# Patient Record
Sex: FEMALE | Race: WHITE | NOT HISPANIC OR LATINO | Employment: STUDENT | URBAN - METROPOLITAN AREA
[De-identification: names, ages, dates, MRNs, and addresses within clinical notes are randomized per-mention and may not be internally consistent; named-entity substitution may affect disease eponyms.]

---
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
## 2018-04-19 DIAGNOSIS — L70.0 ACNE VULGARIS: ICD-10-CM

## 2018-04-19 RX ORDER — TRETINOIN 0.25 MG/G
CREAM TOPICAL
Qty: 45 G | Refills: 0 | Status: SHIPPED | OUTPATIENT
Start: 2018-04-19 | End: 2018-07-19

## 2018-04-19 NOTE — LETTER
[facility]  [address]  [facility] [address]  Phone: [phone]    April 19, 2018    Suzanne Wray                                                                                                            [address] [place] [address]            Dear Ms. Wray,    We are concerned about your health care.  We recently provided you with a medication refill.  Many medications require routine follow-up with your Dermatology Provider.        At this time we ask that: You schedule a routine office visit with your Dermatology Provider to follow your Acne. Per 8-7-2017 Dermatology office visit dictation, you were to return to Dermatology clinic in 8 weeks for a recheck Acne appointment.    Your prescription: Has been refilled so you may have time for the above noted follow-up.  Please be seen prior to needing your next refill of medication.     Thank you,      Lyla SANDOVAL / [facility]

## 2018-04-19 NOTE — TELEPHONE ENCOUNTER
Needs Acne recheck appointment. Letter sent and note sent to pharmacy as well. Alba Lizarraga RN

## 2018-07-19 DIAGNOSIS — L70.0 ACNE VULGARIS: ICD-10-CM

## 2018-07-19 NOTE — TELEPHONE ENCOUNTER
Reason for Call:  Medication or medication refill:    Do you use a [facility]?  Name of the pharmacy and phone [facility] for the current request:  [facility] [phone]    Name of the medication requested: Tretinoin    Other request:   LAST REFILL: 04/19/2018  LOV: 08/07/2017      Can we leave a detailed message on this number? Not Applicable    Phone number patient can be reached at: Home number on file [phone] (home)    Best Time: NA    Call taken on 7/19/2018 at 8:47 AM by Denise Behrendt

## 2018-07-19 NOTE — LETTER
[facility]  [address]  [facility] [address]  Phone: [phone]    2018    Suzanne Wray                                                                                                            [address] [place] [address]            Dear Ms. Wray,    We are concerned about your health care.  We recently provided you with a medication refill.  Many medications require routine follow-up with your Dermatology Provider.      At this time we ask that: You schedule a routine office visit with your Dermatology Provider to follow your Acne. You need to be seen annually for a prescription renewal. You were last seen in Dermatology on 17.    Your prescription: Will soon be  and has been refilled for 1 month so you may have time for the above noted follow-up. Please be seen prior to needing your next refill of medication.     We are currently booking appointments 6 to 8 weeks in advance.      Thank you,      Lyla SANDOVAL / [facility]

## 2018-07-20 RX ORDER — TRETINOIN 0.25 MG/G
CREAM TOPICAL
Qty: 45 G | Refills: 0 | Status: SHIPPED | OUTPATIENT
Start: 2018-07-20 | End: 2018-10-26

## 2018-10-26 ENCOUNTER — OFFICE VISIT (OUTPATIENT)
Dept: FAMILY MEDICINE | Facility: CLINIC | Age: 18
End: 2018-10-26
Payer: COMMERCIAL

## 2018-10-26 VITALS
RESPIRATION RATE: 16 BRPM | BODY MASS INDEX: 21.68 KG/M2 | HEIGHT: 64 IN | TEMPERATURE: 97.8 F | OXYGEN SATURATION: 99 % | WEIGHT: 127 LBS | SYSTOLIC BLOOD PRESSURE: 118 MMHG | HEART RATE: 86 BPM | DIASTOLIC BLOOD PRESSURE: 70 MMHG

## 2018-10-26 DIAGNOSIS — Z00.00 ROUTINE GENERAL MEDICAL EXAMINATION AT A HEALTH CARE FACILITY: Primary | ICD-10-CM

## 2018-10-26 DIAGNOSIS — Z23 NEED FOR PROPHYLACTIC VACCINATION AND INOCULATION AGAINST INFLUENZA: ICD-10-CM

## 2018-10-26 DIAGNOSIS — L70.0 ACNE VULGARIS: ICD-10-CM

## 2018-10-26 DIAGNOSIS — Z11.1 SCREENING EXAMINATION FOR PULMONARY TUBERCULOSIS: ICD-10-CM

## 2018-10-26 PROCEDURE — 90471 IMMUNIZATION ADMIN: CPT | Performed by: NURSE PRACTITIONER

## 2018-10-26 PROCEDURE — 90686 IIV4 VACC NO PRSV 0.5 ML IM: CPT | Performed by: NURSE PRACTITIONER

## 2018-10-26 PROCEDURE — 99395 PREV VISIT EST AGE 18-39: CPT | Mod: 25 | Performed by: NURSE PRACTITIONER

## 2018-10-26 PROCEDURE — 86580 TB INTRADERMAL TEST: CPT | Performed by: NURSE PRACTITIONER

## 2018-10-26 RX ORDER — TRETINOIN 0.25 MG/G
CREAM TOPICAL
Qty: 45 G | Refills: 3 | Status: SHIPPED | OUTPATIENT
Start: 2018-10-26 | End: 2022-01-26

## 2018-10-26 NOTE — PROGRESS NOTES
SUBJECTIVE:   CC: Suzanne Wray is an 17 year old woman who presents for preventive health visit.     Healthy Habits:    Do you get at least three servings of calcium containing foods daily (dairy, green leafy vegetables, etc.)? yes    Amount of exercise or daily activities, outside of work: 5-6 day(s) per week    Problems taking medications regularly No    Medication side effects: No    Have you had an eye exam in the past two years? yes    Do you see a dentist twice per year? yes    Do you have sleep apnea, excessive snoring or daytime drowsiness?no      Forms for College, she is at [employer] and will studying abroad and going to [place]. Forms needed to be signed.  Feels fine, no emotional issues or concerns.  Acne and no other medical concerns.      Today's PHQ-2 Score:   PHQ-2 ( 1999 Pfizer) 10/26/2018 10/27/2017   Q1: Little interest or pleasure in doing things 0 0   Q2: Feeling down, depressed or hopeless 0 0   PHQ-2 Score 0 0       Abuse: Current or Past(Physical, Sexual or Emotional)- No  Do you feel safe in your environment - Yes    Social History   Substance Use Topics     Smoking status: Never Smoker     Smokeless tobacco: Never Used     Alcohol use No     If you drink alcohol do you typically have >3 drinks per day or >7 drinks per week? No                     Reviewed orders with patient.  Reviewed health maintenance and updated orders accordingly - Yes  BP Readings from Last 3 Encounters:   10/26/18 118/70   10/27/17 123/76   08/07/17 114/68    Wt Readings from Last 3 Encounters:   10/26/18 127 lb (57.6 kg) (48 %)*   10/27/17 129 lb (58.5 kg) (56 %)*   10/03/16 126 lb (57.2 kg) (55 %)*     * Growth percentiles are based on CDC 2-20 Years data.                  Patient Active Problem List   Diagnosis     Acne     POD (perioral dermatitis)     History reviewed. No pertinent surgical history.    Social History   Substance Use Topics     Smoking status: Never Smoker     Smokeless tobacco: Never  Used     Alcohol use No     Family History   Problem Relation Age of Onset     Allergies Mother      C.A.D. Maternal Grandfather      MI     Respiratory Maternal Grandfather      asthma     Breast Cancer Paternal Grandmother      Cancer Paternal Grandmother      Arthritis Paternal Grandmother      Eye Disorder Paternal Grandmother      Coronary Artery Disease Maternal Grandmother 80     MI     Coronary Artery Disease Paternal Grandfather      MI     Asthma Brother      Allergies Brother          Current Outpatient Prescriptions   Medication Sig Dispense Refill     tretinoin (RETIN-A) 0.025 % cream Spread a pea size amount into affected area topically at bedtime.  Use sunscreen SPF>20. 45 g 3     No Known Allergies    Mammogram not appropriate for this patient based on age.    Pertinent mammograms are reviewed under the imaging tab.  History of abnormal Pap smear: NO - under age 21, PAP not appropriate for age     Reviewed and updated as needed this visit by clinical staff  Tobacco  Allergies  Meds  Med Hx  Surg Hx  Fam Hx  Soc Hx        Reviewed and updated as needed this visit by Provider        Past Medical History:   Diagnosis Date     Acne       History reviewed. No pertinent surgical history.    ROS:  CONSTITUTIONAL: NEGATIVE for fever, chills, change in weight  INTEGUMENTARU/SKIN: NEGATIVE for worrisome rashes, moles or lesions  EYES: NEGATIVE for vision changes or irritation  ENT: NEGATIVE for ear, mouth and throat problems  RESP: NEGATIVE for significant cough or SOB  BREAST: NEGATIVE for masses, tenderness or discharge  CV: NEGATIVE for chest pain, palpitations or peripheral edema  GI: NEGATIVE for nausea, abdominal pain, heartburn, or change in bowel habits  : NEGATIVE for unusual urinary or vaginal symptoms. Periods are regular.  MUSCULOSKELETAL: NEGATIVE for significant arthralgias or myalgia  NEURO: NEGATIVE for weakness, dizziness or paresthesias  PSYCHIATRIC: NEGATIVE for changes in mood or  "affect    OBJECTIVE:   /70 (BP Location: Right arm, Patient Position: Chair, Cuff Size: Adult Regular)  Pulse 86  Temp 97.8  F (36.6  C) (Oral)  Resp 16  Ht 5' 3.75\" (1.619 m)  Wt 127 lb (57.6 kg)  LMP 10/18/2018 (Approximate)  SpO2 99%  BMI 21.97 kg/m2  EXAM:  GENERAL: healthy, alert and no distress  EYES: Eyes grossly normal to inspection, PERRL and conjunctivae and sclerae normal  HENT: ear canals and TM's normal, nose and mouth without ulcers or lesions  NECK: no adenopathy, no asymmetry, masses, or scars and thyroid normal to palpation  RESP: lungs clear to auscultation - no rales, rhonchi or wheezes  BREAST: normal without masses, tenderness or nipple discharge and no palpable axillary masses or adenopathy  CV: regular rate and rhythm, normal S1 S2, no S3 or S4, no murmur, click or rub, no peripheral edema and peripheral pulses strong  ABDOMEN: soft, nontender, no hepatosplenomegaly, no masses and bowel sounds normal  MS: no gross musculoskeletal defects noted, no edema  SKIN: no suspicious lesions or rashes  NEURO: Normal strength and tone, mentation intact and speech normal  PSYCH: mentation appears normal, affect normal/bright    Diagnostic Test Results:  No results found for this or any previous visit (from the past 24 hour(s)).    ASSESSMENT/PLAN:   1. Routine general medical examination at a health care facility    Forms completed for her to travel abroad.  CDC web site was consulted and she is UTD on recommended vaccinations.      2. Acne vulgaris    - tretinoin (RETIN-A) 0.025 % cream; Spread a pea size amount into affected area topically at bedtime.  Use sunscreen SPF>20.  Dispense: 45 g; Refill: 3    3. Screening examination for pulmonary tuberculosis    - TB INTRADERMAL TEST    4. Need for prophylactic vaccination and inoculation against influenza    - FLU VACCINE, SPLIT VIRUS, IM (QUADRIVALENT) [60185]- >3 YRS  - Vaccine Administration, Initial [88301]    COUNSELING:   Reviewed " "preventive health counseling, as reflected in patient instructions       Regular exercise       Healthy diet/nutrition       Vision screening       Hearing screening       Safe sex practices/STD prevention    BP Readings from Last 1 Encounters:   10/26/18 118/70     Estimated body mass index is 21.97 kg/(m^2) as calculated from the following:    Height as of this encounter: 5' 3.75\" (1.619 m).    Weight as of this encounter: 127 lb (57.6 kg).           reports that she has never smoked. She has never used smokeless tobacco.      Counseling Resources:  ATP IV Guidelines  Pooled Cohorts Equation Calculator  Breast Cancer Risk Calculator  FRAX Risk Assessment  ICSI Preventive Guidelines  Dietary Guidelines for Americans, 2010  Scribz's MyPlate  ASA Prophylaxis  Lung CA Screening    NANNETTE Askew [facility]    Injectable Influenza Immunization Documentation    1.  Is the person to be vaccinated sick today?   No    2. Does the person to be vaccinated have an allergy to a component   of the vaccine?   No  Egg Allergy Algorithm Link    3. Has the person to be vaccinated ever had a serious reaction   to influenza vaccine in the past?   No    4. Has the person to be vaccinated ever had Guillain-Barré syndrome?   No    Form completed by Micki Pulido MA         The patient is asked the following questions today and these are her answers:    -Have you had a mantoux administered in the past 30 days?    No  -Have you had a previous positive Mantoux.  No  -Have you received BCG in the past.  No  -Have you had a live vaccine  (MMR, Varicella, OPV, Yellow Fever) in the last 6 weeks.  No  -Have you had and active  viral or bacterial infection in the past 6 weeks.  No  -Have you received corticosteroids or immunosuppressive agents in the past 6 weeks.  No  -Have you been diagnosed with HIV?  No  -Do you have a maglinancy?  No  "

## 2018-10-26 NOTE — MR AVS SNAPSHOT
After Visit Summary   10/26/2018    Suzanne Wray    MRN: [id number]           Patient Information     Date Of Birth          2000        Visit Information        Provider Department      10/26/2018 10:40 AM Tiffany Farfan APRN [facility]        Today's Diagnoses     Routine general medical examination at a health care facility    -  1    Acne vulgaris        Screening examination for pulmonary tuberculosis        Need for prophylactic vaccination and inoculation against influenza          Care Instructions      Preventive Health Recommendations  Female Ages 18 to 20     Yearly exam:     See your health care provider every year in order to  o Review health changes.   o Discuss preventive care.    o Review your medicines if your doctor has prescribed any.      You should be tested each year for STDs (sexually transmitted diseases).       After age 20, talk to your provider about how often you should have cholesterol testing.      If you are at risk for diabetes, you should have a diabetes test (fasting glucose).     Shots:     Get a flu shot each year.     Get a tetanus shot every 10 years.     Consider getting the shot (vaccine) that prevents cervical cancer (Gardasil).    Nutrition:     Eat at least 5 servings of fruits and vegetables each day.    Eat whole-grain bread, whole-wheat pasta and brown rice instead of white grains and rice.    Get adequate Calcium and Vitamin D.     Lifestyle    Exercise at least 150 minutes a week each week (30 minutes a day, 5 days a week). This will help you control your weight and prevent disease.    No smoking.     Wear sunscreen to prevent skin cancer.    See your dentist every six months for an exam and cleaning.          Follow-ups after your visit        Follow-up notes from your care team     Return in about 1 year (around 10/26/2019) for Routine Visit, or sooner if symptoms persist or worsen.      Your next 10 appointments  "already scheduled     Dec 20, 2018  3:00 PM CST   [facility] Visit with Lyla Spain PA-C   [facility]    [address] [address] [phone]              Who to contact     If you have questions or need follow up information about today's clinic visit or your schedule please contact [facility] directly at [phone].  Normal or non-critical lab and imaging results will be communicated to you by MobileDevHQhart, letter or phone within 4 business days after the clinic has received the results. If you do not hear from us within 7 days, please contact the clinic through MobileDevHQhart or phone. If you have a critical or abnormal lab result, we will notify you by phone as soon as possible.  Submit refill requests through GoComm or call your pharmacy and they will forward the refill request to us. Please allow 3 business days for your refill to be completed.          Additional Information About Your Visit        MobileDevHQharKryptiq Information     GoComm lets you send messages to your doctor, view your test results, renew your prescriptions, schedule appointments and more. To sign up, go to [email or web address] . Click on \"Log in\" on the left side of the screen, which will take you to the Welcome page. Then click on \"Sign up Now\" on the right side of the page.     You will be asked to enter the access code listed below, as well as some personal information. Please follow the directions to create your username and password.     Your access code is: [email or web address]  Expires: 2019 11:28 AM     Your access code will  in 90 days. If you need help or a new code, please call your [facility] or [phone].        Care EveryWhere ID     This is your Care EveryWhere ID. This could be used by other organizations to access your [place] medical records  [id number]        Your Vitals Were     Pulse Temperature Respirations Height Last Period Pulse " "Oximetry    86 97.8  F (36.6  C) (Oral) 16 5' 3.75\" (1.619 m) 10/18/2018 (Approximate) 99%    BMI (Body Mass Index)                   21.97 kg/m2            Blood Pressure from Last 3 Encounters:   10/26/18 118/70   10/27/17 123/76   08/07/17 114/68    Weight from Last 3 Encounters:   10/26/18 127 lb (57.6 kg) (48 %)*   10/27/17 129 lb (58.5 kg) (56 %)*   10/03/16 126 lb (57.2 kg) (55 %)*     * Growth percentiles are based on [facility] 2-20 Years data.              We Performed the Following     FLU VACCINE, SPLIT VIRUS, IM (QUADRIVALENT) [04133]- >3 YRS     TB INTRADERMAL TEST     Vaccine Administration, Initial [02116]          Where to get your medicines      These medications were sent to [facility] [id number] [place] [address]    Hours:  [facility] Phone:  [phone]     tretinoin 0.025 % cream          Primary Care Provider Office Phone # Fax #    Lisbeth Hassan [phone] [address] [facility] [address]        Equal Access to Services     CARMELITA MEJIA AH: Hadii loli matt hadasho Soomaali, waaxda luqadaha, qaybta kaalmada adeegyada, suzy betancourt. So [facility] [place] [facility] [phone].    ATENCIÓN: Si habla español, tiene a rowley disposición servicios gratuitos de asistencia lingüística. Dawson al [phone].    We comply with applicable federal civil rights laws and [place] laws. We do not discriminate on the basis of race, color, national origin, age, disability, sex, sexual orientation, or gender identity.            Thank you!     Thank you for choosing [facility][note 2]  for your care. Our goal is always to provide you with excellent care. Hearing back from our patients is one way we can continue to improve our services. Please take a few minutes to complete the written survey that you may receive in the mail after your visit with us. Thank you!             Your " Updated Medication List - Protect others around you: Learn how to safely use, store and throw away your medicines at www.disposemymeds.org.          This list is accurate as of 10/26/18 11:28 AM.  Always use your most recent med list.                   Brand Name Dispense Instructions for use Diagnosis    tretinoin 0.025 % cream    RETIN-A    45 g    Spread a pea size amount into affected area topically at bedtime.  Use sunscreen SPF>20.    Acne vulgaris

## 2018-10-26 NOTE — NURSING NOTE
VISION   No corrective lenses  Tool used: Isiah   Right eye:        10/6.3 (20/12.5)  Left eye:          10/8 (20/16)  Visual Acuity: Pass    Micki Pulido MA

## 2019-01-03 ENCOUNTER — OFFICE VISIT (OUTPATIENT)
Dept: FAMILY MEDICINE | Facility: CLINIC | Age: 19
End: 2019-01-03
Payer: COMMERCIAL

## 2019-01-03 VITALS
HEART RATE: 98 BPM | SYSTOLIC BLOOD PRESSURE: 138 MMHG | BODY MASS INDEX: 22.16 KG/M2 | TEMPERATURE: 97.3 F | WEIGHT: 129.8 LBS | OXYGEN SATURATION: 100 % | DIASTOLIC BLOOD PRESSURE: 60 MMHG | RESPIRATION RATE: 16 BRPM | HEIGHT: 64 IN

## 2019-01-03 DIAGNOSIS — H92.03 EARACHE SYMPTOMS IN BOTH EARS: Primary | ICD-10-CM

## 2019-01-03 PROCEDURE — 99213 OFFICE O/P EST LOW 20 MIN: CPT | Performed by: NURSE PRACTITIONER

## 2019-01-03 ASSESSMENT — MIFFLIN-ST. JEOR: SCORE: 1340.28

## 2019-01-03 NOTE — PATIENT INSTRUCTIONS
Patient Education     Earache, No Infection (Adult)  Earaches can happen without an infection. This occurs when air and fluid build up behind the eardrum causing a feeling of fullness and discomfort and reduced hearing. This is called otitis media with effusion (OME) or serous otitis media. It means there is fluid in the middle ear. It is not the same as acute otitis media, which is typically from infection.  OME can happen when you have a cold if congestion blocks the passage that drains the middle ear. This passage is called the eustachian tube. OME may also occur with nasal allergies or after a bacterial middle ear infection.    The pain or discomfort may come and go. You may hear clicking or popping sounds when you chew or swallow. You may feel that your balance is off. Or you may hear ringing in the ear.  It often takes from several weeks up to 3 months for the fluid to clear on its own. Oral pain relievers and ear drops help if there is pain. Decongestants and antihistamines sometimes help. Antibiotics don't help since there is no infection. Your doctor may prescribe a nasal spray to help reduce swelling in the nose and eustachian tube. This can allow the ear to drain.  If your OME doesn't improve after 3 months, surgery may be used to drain the fluid and insert a small tube in the eardrum to allow continued drainage.  Because the middle ear fluid can become infected, it is important to watch for signs of an ear infection which may develop later. These signs include increased ear pain, fever, or drainage from the ear.  Home care  The following guidelines will help you care for yourself at home:    You may use over-the-counter medicine as directed to control pain, unless another medicine was prescribed. If you have chronic liver or kidney disease or ever had a stomach ulcer or GI bleeding, talk with your doctor before using these medicines. Aspirin should never be used in anyone under 18 years of age who is  ill with a fever. It may cause severe liver damage.    You may use over-the-counter decongestants such as phenylephrine or pseudoephedrine. But they are not always helpful. Don't use nasal spray decongestants more than 3 days. Longer use can make congestion worse. Prescription nasal sprays from your doctor don't typically have those restrictions.    Antihistamines may help if you are also having allergy symptoms.    You may use medicines such as guaifenesin to thin mucus and promote drainage.  Follow-up care  Follow up with your healthcare provider or as advised if you are not feeling better after 3 days.  When to seek medical advice  Call your healthcare provider right away if any of the following occur:    Your ear pain gets worse or does not start to improve     Fever of 100.4 F (38 C) or higher, or as directed by your healthcare provider    Fluid or blood draining from the ear    Headache or sinus pain    Stiff neck    Unusual drowsiness or confusion  Date Last Reviewed: 10/1/2016    7860-2975 [employer]. [address] [place] [address], [place] [address]. All rights reserved. This information is not intended as a substitute for professional medical care. Always follow your healthcare professional's instructions.

## 2019-01-03 NOTE — PROGRESS NOTES
SUBJECTIVE:   Suzanne Wray is a 18 year old female who presents to clinic today for the following health issues:      ENT Symptoms             Symptoms: cc Present Absent Comment   Fever/Chills   x    Fatigue   x    Muscle Aches   x    Eye Irritation   x    Sneezing   x    Nasal Flavio/Drg   x    Sinus Pressure/Pain   x    Loss of smell   x    Dental pain   x    Sore Throat   x    Swollen Glands   x    Ear Pain/Fullness x x  Intermittent tenderness and pressure -in both ears but mostly in left ear   Cough   x    Wheeze   x    Chest Pain   x    Shortness of breath   x    Rash   x    Other         Symptom duration:   On and off x 4 days    Symptom severity:  mild    Treatments tried:  none    Contacts:  none. No recent illness, is feeling very stressed out, she is leaving to study abroad in 2 days and is trying to get everything together and ready to leave.        Problem list and histories reviewed & adjusted, as indicated.  Additional history: as documented    Patient Active Problem List   Diagnosis     Acne     POD (perioral dermatitis)     History reviewed. No pertinent surgical history.    Social History     Tobacco Use     Smoking status: Never Smoker     Smokeless tobacco: Never Used   Substance Use Topics     Alcohol use: No     Family History   Problem Relation Age of Onset     Allergies Mother      C.A.D. Maternal Grandfather         MI     Respiratory Maternal Grandfather         asthma     Breast Cancer Paternal Grandmother      Cancer Paternal Grandmother      Arthritis Paternal Grandmother      Eye Disorder Paternal Grandmother      Coronary Artery Disease Maternal Grandmother 80        MI     Coronary Artery Disease Paternal Grandfather         MI     Asthma Brother      Allergies Brother            Reviewed and updated as needed this visit by clinical staff       Reviewed and updated as needed this visit by Provider         ROS:  Constitutional, HEENT, cardiovascular, pulmonary, gi and gu systems  "are negative, except as otherwise noted.    OBJECTIVE:     /60   Pulse 98   Temp 97.3  F (36.3  C) (Tympanic)   Resp 16   Ht 1.62 m (5' 3.78\")   Wt 58.9 kg (129 lb 12.8 oz)   SpO2 100%   BMI 22.43 kg/m    Body mass index is 22.43 kg/m .  GENERAL: healthy, alert and no distress  EYES: Eyes grossly normal to inspection, PERRL and conjunctivae and sclerae normal  HENT: normal cephalic/atraumatic, both ears: normal: no effusions, no erythema, normal landmarks, retracted TM's, nose and mouth without ulcers or lesions, nasal mucosa edematous , rhinorrhea clear, oropharynx clear, oral mucous membranes moist and sinuses: not tender  NECK: no adenopathy, no asymmetry, masses, or scars and thyroid normal to palpation  RESP: lungs clear to auscultation - no rales, rhonchi or wheezes  CV: regular rates and rhythm, normal S1 S2, no S3 or S4 and no murmur, click or rub  SKIN: no suspicious lesions or rashes  NEURO: Normal strength and tone, mentation intact and speech normal    Diagnostic Test Results:  none     ASSESSMENT/PLAN:       ICD-10-CM    1. Earache symptoms in both ears H92.03        FUTURE APPOINTMENTS:       - Follow up in 1-2 weeks for persistent symptoms, sooner for new or worsening symptoms.     Patient Instructions     Patient Education     Earache, No Infection (Adult)  Earaches can happen without an infection. This occurs when air and fluid build up behind the eardrum causing a feeling of fullness and discomfort and reduced hearing. This is called otitis media with effusion (OME) or serous otitis media. It means there is fluid in the middle ear. It is not the same as acute otitis media, which is typically from infection.  OME can happen when you have a cold if congestion blocks the passage that drains the middle ear. This passage is called the eustachian tube. OME may also occur with nasal allergies or after a bacterial middle ear infection.    The pain or discomfort may come and go. You may hear " clicking or popping sounds when you chew or swallow. You may feel that your balance is off. Or you may hear ringing in the ear.  It often takes from several weeks up to 3 months for the fluid to clear on its own. Oral pain relievers and ear drops help if there is pain. Decongestants and antihistamines sometimes help. Antibiotics don't help since there is no infection. Your doctor may prescribe a nasal spray to help reduce swelling in the nose and eustachian tube. This can allow the ear to drain.  If your OME doesn't improve after 3 months, surgery may be used to drain the fluid and insert a small tube in the eardrum to allow continued drainage.  Because the middle ear fluid can become infected, it is important to watch for signs of an ear infection which may develop later. These signs include increased ear pain, fever, or drainage from the ear.  Home care  The following guidelines will help you care for yourself at home:    You may use over-the-counter medicine as directed to control pain, unless another medicine was prescribed. If you have chronic liver or kidney disease or ever had a stomach ulcer or GI bleeding, talk with your doctor before using these medicines. Aspirin should never be used in anyone under 18 years of age who is ill with a fever. It may cause severe liver damage.    You may use over-the-counter decongestants such as phenylephrine or pseudoephedrine. But they are not always helpful. Don't use nasal spray decongestants more than 3 days. Longer use can make congestion worse. Prescription nasal sprays from your doctor don't typically have those restrictions.    Antihistamines may help if you are also having allergy symptoms.    You may use medicines such as guaifenesin to thin mucus and promote drainage.  Follow-up care  Follow up with your healthcare provider or as advised if you are not feeling better after 3 days.  When to seek medical advice  Call your healthcare provider right away if any of  the following occur:    Your ear pain gets worse or does not start to improve     Fever of 100.4 F (38 C) or higher, or as directed by your healthcare provider    Fluid or blood draining from the ear    Headache or sinus pain    Stiff neck    Unusual drowsiness or confusion  Date Last Reviewed: 10/1/2016    6772-1000 [employer]. [address] [place] [address]. All rights reserved. This information is not intended as a substitute for professional medical care. Always follow your healthcare professional's instructions.               NANNETTE Wiley [facility]

## 2019-12-27 ENCOUNTER — OFFICE VISIT (OUTPATIENT)
Dept: FAMILY MEDICINE | Facility: CLINIC | Age: 19
End: 2019-12-27
Payer: COMMERCIAL

## 2019-12-27 VITALS
OXYGEN SATURATION: 99 % | TEMPERATURE: 97.6 F | SYSTOLIC BLOOD PRESSURE: 122 MMHG | HEIGHT: 64 IN | BODY MASS INDEX: 22.88 KG/M2 | DIASTOLIC BLOOD PRESSURE: 62 MMHG | HEART RATE: 104 BPM | RESPIRATION RATE: 16 BRPM | WEIGHT: 134 LBS

## 2019-12-27 DIAGNOSIS — R09.82 POST-NASAL DRIP: ICD-10-CM

## 2019-12-27 DIAGNOSIS — R05.9 COUGH: Primary | ICD-10-CM

## 2019-12-27 PROCEDURE — 99213 OFFICE O/P EST LOW 20 MIN: CPT | Performed by: NURSE PRACTITIONER

## 2019-12-27 RX ORDER — BENZONATATE 200 MG/1
200 CAPSULE ORAL 3 TIMES DAILY PRN
Qty: 30 CAPSULE | Refills: 0 | Status: SHIPPED | OUTPATIENT
Start: 2019-12-27 | End: 2022-01-26

## 2019-12-27 ASSESSMENT — MIFFLIN-ST. JEOR: SCORE: 1353.85

## 2019-12-27 NOTE — NURSING NOTE
"Chief Complaint   Patient presents with     Cough       Initial /62 (BP Location: Right arm, Patient Position: Chair, Cuff Size: Adult Regular)   Pulse 104   Temp 97.6  F (36.4  C) (Tympanic)   Resp 16   Wt 60.8 kg (134 lb)   SpO2 99%   BMI 23.16 kg/m   Estimated body mass index is 23.16 kg/m  as calculated from the following:    Height as of 1/3/19: 1.62 m (5' 3.78\").    Weight as of this encounter: 60.8 kg (134 lb).    Patient presents to the clinic using No DME    Health Maintenance that is potentially due pending provider review:  NONE    n/a    Is there anyone who you would like to be able to receive your results? No  If yes have patient fill out TON  Libby Infante M.A.        "

## 2019-12-27 NOTE — PROGRESS NOTES
Subjective     Suzanne Wray is a 19 year old female who presents to clinic today for the following health issues:    HPI   ENT Symptoms             Symptoms: cc Present Absent Comment   Fever/Chills       Fatigue       Muscle Aches       Eye Irritation       Sneezing       Nasal Flavio/Drg  x  Some drainage, especially at night   Sinus Pressure/Pain       Loss of smell       Dental pain       Sore Throat       Swollen Glands       Ear Pain/Fullness       Cough  x  Worse at night.    Wheeze       Chest Pain       Shortness of breath       Rash       Other         Symptom duration:  2 weeks    Symptom severity:  worsening    Treatments tried:  Decongestant    Contacts:  none           Patient Active Problem List   Diagnosis     Acne     POD (perioral dermatitis)     History reviewed. No pertinent surgical history.    Social History     Tobacco Use     Smoking status: Never Smoker     Smokeless tobacco: Never Used   Substance Use Topics     Alcohol use: No     Family History   Problem Relation Age of Onset     Allergies Mother      C.A.D. Maternal Grandfather         MI     Respiratory Maternal Grandfather         asthma     Breast Cancer Paternal Grandmother      Cancer Paternal Grandmother      Arthritis Paternal Grandmother      Eye Disorder Paternal Grandmother      Coronary Artery Disease Maternal Grandmother 80        MI     Coronary Artery Disease Paternal Grandfather         MI     Asthma Brother      Allergies Brother          Current Outpatient Medications   Medication Sig Dispense Refill     benzonatate (TESSALON) 200 MG capsule Take 1 capsule (200 mg) by mouth 3 times daily as needed for cough 30 capsule 0     tretinoin (RETIN-A) 0.025 % cream Spread a pea size amount into affected area topically at bedtime.  Use sunscreen SPF>20. (Patient not taking: Reported on 12/27/2019) 45 g 3     No Known Allergies      Reviewed and updated as needed this visit by Provider  Tobacco  Allergies  Meds  Problems   "Med Hx  Surg Hx  Fam Hx         Review of Systems   ROS COMP: Constitutional, HEENT, cardiovascular, pulmonary, GI, , musculoskeletal, neuro, skin, endocrine and psych systems are negative, except as otherwise noted.      Objective    /62 (BP Location: Right arm, Patient Position: Chair, Cuff Size: Adult Regular)   Pulse 104   Temp 97.6  F (36.4  C) (Tympanic)   Resp 16   Ht 1.619 m (5' 3.75\")   Wt 60.8 kg (134 lb)   SpO2 99%   BMI 23.18 kg/m    Body mass index is 23.18 kg/m .  Physical Exam   GENERAL: healthy, alert and no distress, nontoxic in appearance  EYES: Eyes grossly normal to inspection, PERRL and conjunctivae and sclerae normal  HENT: ear canals and TM's normal, nose and mouth without ulcers or lesions  NECK: no adenopathy, supple with full ROM  RESP: lungs clear to auscultation - no rales, rhonchi or wheezes  CV: regular rate and rhythm, normal S1 S2, no S3 or S4, no murmur, click or rub, no peripheral edema   ABDOMEN: soft, nontender, no hepatosplenomegaly, no masses and bowel sounds normal  MS: no gross musculoskeletal defects noted, no edema  No rash    Diagnostic Test Results:  Labs reviewed in Epic  No results found for this or any previous visit (from the past 24 hour(s)).        Assessment & Plan   Problem List Items Addressed This Visit     None      Visit Diagnoses     Cough    -  Primary    Relevant Medications    benzonatate (TESSALON) 200 MG capsule    Post-nasal drip                   Patient Instructions   Increase rest and fluids. Tylenol and/or Ibuprofen for comfort. Cool mist vaporizer. If your symptoms worsen or do not resolve follow up with your primary care provider in 1 week and sooner if needed.      Mucinex 600 mg 12 hour formula for ear, head and chest congestion.  It can also thin post nasal drip which can cause a cough and sore throat.    Indications for emergent return to emergency department discussed with patient, who verbalized good understanding and " agreement.  Patient understands the limitations of today's evaluation.             Return in about 2 weeks (around 1/10/2020), or if symptoms worsen or fail to improve, for Follow up with your primary care provider.    NANNETTE Heredia [facility]

## 2019-12-27 NOTE — PATIENT INSTRUCTIONS
Increase rest and fluids. Tylenol and/or Ibuprofen for comfort. Cool mist vaporizer. If your symptoms worsen or do not resolve follow up with your primary care provider in 1 week and sooner if needed.      Mucinex 600 mg 12 hour formula for ear, head and chest congestion.  It can also thin post nasal drip which can cause a cough and sore throat.    Indications for emergent return to emergency department discussed with patient, who verbalized good understanding and agreement.  Patient understands the limitations of today's evaluation.

## 2020-10-28 ENCOUNTER — TELEPHONE (OUTPATIENT)
Dept: FAMILY MEDICINE | Facility: CLINIC | Age: 20
End: 2020-10-28

## 2020-10-28 NOTE — TELEPHONE ENCOUNTER
Panel Management Review      Patient has the following on her problem list: None      Composite cancer screening  Chart review shows that this patient is due/due soon for the following Pap Smear  Summary:    Patient is due/failing the following:   PAP    Action needed:   Patient needs office visit for pap.    Type of outreach:    Phone, left message for patient to call back.     Questions for provider review:    None                                                                                                                                    Stacia Rivers MA       Chart routed to Care Team .

## 2020-12-21 ENCOUNTER — ALLIED HEALTH/NURSE VISIT (OUTPATIENT)
Dept: FAMILY MEDICINE | Facility: CLINIC | Age: 20
End: 2020-12-21
Payer: COMMERCIAL

## 2020-12-21 DIAGNOSIS — Z23 NEED FOR VACCINATION: ICD-10-CM

## 2020-12-21 DIAGNOSIS — Z23 NEED FOR PROPHYLACTIC VACCINATION AND INOCULATION AGAINST INFLUENZA: ICD-10-CM

## 2020-12-21 PROCEDURE — 99207 PR NO CHARGE NURSE ONLY: CPT

## 2020-12-21 PROCEDURE — 90686 IIV4 VACC NO PRSV 0.5 ML IM: CPT

## 2020-12-21 PROCEDURE — 90471 IMMUNIZATION ADMIN: CPT

## 2020-12-21 PROCEDURE — 90715 TDAP VACCINE 7 YRS/> IM: CPT

## 2020-12-21 PROCEDURE — 90472 IMMUNIZATION ADMIN EACH ADD: CPT

## 2020-12-26 ENCOUNTER — NURSE TRIAGE (OUTPATIENT)
Dept: NURSING | Facility: CLINIC | Age: 20
End: 2020-12-26

## 2020-12-26 NOTE — TELEPHONE ENCOUNTER
Suzanne reports an ingrown toenail to her left foot, great toe. She notes a small amout of pus coming from the nail edge.    There is not a great deal of pain.    Per protocol, advised to be seen by provider within 24 hours    COVID 19 Nurse Triage Plan/Patient Instructions    Please be aware that novel coronavirus (COVID-19) may be circulating in the community. If you develop symptoms such as fever, cough, or SOB or if you have concerns about the presence of another infection including coronavirus (COVID-19), please contact your health care provider or visit [email or web address].     Disposition/Instructions    In-Person Visit with provider recommended. Reference Visit Selection Guide.    Thank you for taking steps to prevent the spread of this virus.  o Limit your contact with others.  o Wear a simple mask to cover your cough.  o Wash your hands well and often.    Resources    [facility]: About COVID-19: [email or web address]    CDC: What to Do If You're Sick: www.cdc.gov/coronavirus/2019-ncov/about/steps-when-sick.html    CDC: Ending Home Isolation: www.cdc.gov/coronavirus/2019-ncov/hcp/disposition-in-home-patients.html     CDC: Caring for Someone: www.cdc.gov/coronavirus/2019-ncov/if-you-are-sick/care-for-someone.html     [facility]: Interim Guidance for Hospital Discharge to Home: www.health.[facility].[place]/diseases/coronavirus/hcp/hospdischarge.pdf    [facility] clinical trials (COVID-19 research studies): clinicalaffairs.[facility]/umn-clinical-trials     Below are the COVID-19 hotlines at the [place] Department of Health ([facility]). Interpreters are available.   o For health questions: Call 280-576-8504 or 1-641.338.7180 (7 a.m. to 7 p.m.)  o For questions about schools and childcare: Call 420-422-7470 or 1-961.308.6798 (7 a.m. to 7 p.m.)     Christa Bettencourt RN  [facility]      Additional Information    Negative: Patient sounds very sick or weak to the triager    Negative: [1]  Looks infected (e.g., spreading redness, red streak, pus) AND [2] fever    Negative: [1] Red streaking AND [2] longer than 1 inch (2.5 cm)    Negative: [1] Skin around the nail has become red AND [2] larger than 2 inches (5 cm)    Negative: Entire toe is red    Negative: Entire toe is swollen    Yellow pus seen in skin around toenail (cuticle area), or pus seen under toenail    Protocols used: TOENAIL - INGROWN-A-AH

## 2021-04-03 ENCOUNTER — HEALTH MAINTENANCE LETTER (OUTPATIENT)
Age: 21
End: 2021-04-03

## 2021-09-18 ENCOUNTER — HEALTH MAINTENANCE LETTER (OUTPATIENT)
Age: 21
End: 2021-09-18

## 2021-12-28 ENCOUNTER — IMMUNIZATION (OUTPATIENT)
Dept: FAMILY MEDICINE | Facility: CLINIC | Age: 21
End: 2021-12-28
Payer: COMMERCIAL

## 2021-12-28 DIAGNOSIS — Z23 HIGH PRIORITY FOR 2019-NCOV VACCINE: Primary | ICD-10-CM

## 2021-12-28 PROCEDURE — 91300 COVID-19,PF,PFIZER (12+ YRS): CPT

## 2021-12-28 PROCEDURE — 0004A COVID-19,PF,PFIZER (12+ YRS): CPT

## 2021-12-28 PROCEDURE — 99207 PR NO CHARGE LOS: CPT

## 2022-01-25 ENCOUNTER — NURSE TRIAGE (OUTPATIENT)
Dept: NURSING | Facility: CLINIC | Age: 22
End: 2022-01-25
Payer: COMMERCIAL

## 2022-01-25 NOTE — TELEPHONE ENCOUNTER
Triage call:     Patient is calling with red and swollen and itchy toes for 3-4 weeks.   She reports they are tender to the touch and she has noted tiny bumps on some of the toes that look like a rash.   They feel tingly at night. She reports she has poor circulation and her mother has Reynaud's.     Per protocol, patient should be seen within 3 days in clinic. Transferred to scheduling for appointment. Encouraged to call back with new or worsening symptoms.   She verbalizes understanding and agreement with this plan.     Katia Mclean RN   01/25/22 12:39 PM  [facility] Nurse [facility]    Reason for Disposition    Numbness or tingling in feet and new or increased    Additional Information    Negative: Major bleeding (actively dripping or spurting) that can't be stopped    Negative: Amputation of toe    Negative: Sounds like a life-threatening emergency to the triager    Negative: Followed an ankle or foot injury    Negative: Ankle pain is the main symptom    Negative: Entire foot is cool or blue in comparison to other foot    Negative: Purple or black skin on foot or toe    Negative: Red area or streak and fever    Negative: Swollen foot and fever    Negative: Patient sounds very sick or weak to the triager    Negative: Looks like a boil, infected sore, deep ulcer, or other infected rash (spreading redness, pus)    Negative: SEVERE pain (e.g., excruciating, unable to do any normal activities)    Negative: Swollen foot (Exceptions: localized bump from bunions, calluses, insect bite, sting)    Negative: Numbness in one foot (i.e., loss of sensation)    Negative: MODERATE pain (e.g., interferes with normal activities, limping) and present > 3 days    Protocols used: FOOT PAIN-A-[place], TOE INJURY-A-OH    COVID 19 Nurse Triage Plan/Patient Instructions    Please be aware that novel coronavirus (COVID-19) may be circulating in the community. If you develop symptoms such as fever, cough, or SOB or if you have concerns  about the presence of another infection including coronavirus (COVID-19), please contact your health care provider or visit [email or web address].     Disposition/Instructions    In-Person Visit with provider recommended. Reference Visit Selection Guide.    Thank you for taking steps to prevent the spread of this virus.  o Limit your contact with others.  o Wear a simple mask to cover your cough.  o Wash your hands well and often.    Resources    [facility]: About COVID-19: [email or web address]    CDC: What to Do If You're Sick: www.cdc.gov/coronavirus/2019-ncov/about/steps-when-sick.html    CDC: Ending Home Isolation: www.cdc.gov/coronavirus/2019-ncov/hcp/disposition-in-home-patients.html     CDC: Caring for Someone: www.cdc.gov/coronavirus/2019-ncov/if-you-are-sick/care-for-someone.html     [facility]: Interim Guidance for Hospital Discharge to Home: www.[facility].[place]/diseases/coronavirus/hcp/hospdischarge.pdf    [facility] clinical trials (COVID-19 research studies): clinicalaffairs.[facility]-clinical-trials     Below are the COVID-19 hotlines at the [place] Department of Health ([facility]). Interpreters are available.   o For health questions: Call 380-773-1102 or 1-977.332.9493 (7 a.m. to 7 p.m.)  o For questions about schools and childcare: Call 805-049-8746 or 1-259.980.9324 (7 a.m. to 7 p.m.)

## 2022-01-26 ENCOUNTER — OFFICE VISIT (OUTPATIENT)
Dept: URGENT CARE | Facility: URGENT CARE | Age: 22
End: 2022-01-26
Payer: COMMERCIAL

## 2022-01-26 VITALS
WEIGHT: 130 LBS | BODY MASS INDEX: 23.04 KG/M2 | RESPIRATION RATE: 16 BRPM | SYSTOLIC BLOOD PRESSURE: 123 MMHG | HEART RATE: 116 BPM | TEMPERATURE: 98 F | DIASTOLIC BLOOD PRESSURE: 86 MMHG | OXYGEN SATURATION: 99 % | HEIGHT: 63 IN

## 2022-01-26 DIAGNOSIS — T69.1XXA PERNIO, INITIAL ENCOUNTER: ICD-10-CM

## 2022-01-26 DIAGNOSIS — Z82.49 FAMILY HISTORY OF RAYNAUD'S PHENOMENON: ICD-10-CM

## 2022-01-26 DIAGNOSIS — T69.1XXA CHILBLAINS, INITIAL ENCOUNTER: Primary | ICD-10-CM

## 2022-01-26 PROCEDURE — 99213 OFFICE O/P EST LOW 20 MIN: CPT | Performed by: INTERNAL MEDICINE

## 2022-01-26 RX ORDER — NIFEDIPINE 30 MG
30 TABLET, EXTENDED RELEASE ORAL DAILY
Qty: 30 TABLET | Refills: 0 | Status: CANCELLED | OUTPATIENT
Start: 2022-01-26 | End: 2022-02-25

## 2022-01-26 ASSESSMENT — ENCOUNTER SYMPTOMS
MYALGIAS: 0
APPETITE CHANGE: 0
ACTIVITY CHANGE: 0
HEADACHES: 0
ARTHRALGIAS: 0

## 2022-01-26 ASSESSMENT — MIFFLIN-ST. JEOR: SCORE: 1313.81

## 2022-01-26 NOTE — PROGRESS NOTES
ASSESSMENT AND PLAN:      ICD-10-CM    1. Chilblains, initial encounter  T69.1XXA    2. Pernio, initial encounter  T69.1XXA    3. Family history of Raynaud's phenomenon  Z82.49        Discussed avoidance of cold (including weather conditions).  Discussed  Avoid outside activity during cold months in [place] (negative degrees Fahrenheit past few days)  Keep warm by wearing appropriately insulated clothing and avoiding unprotected exposure to cold conditions      Discussed potential treatment with calcium channel blockers  Discussed starting long acting nifedine low dose 30     patient prefers conservative therapy instead of starting medication today    PLAN:      Patient Instructions                 Avoid cold weather activities    Wear insulated clothing,  Wear 2 pair socks.  shearlings / insulate shoes.    Recheck 1 month    Watch symptoms of color changes as potentially may also have Raynaud's with your family.    Treatment for both if not improving with conservative measures is   Calcium channel blocker.    establish care recheck 1 month.        No follow-ups on file.        Ijeoma Patel MD  [facility]    Subjective     Suzanne Wray is a 21 year old who presents for Patient presents with:  Urgent Care: chilblains in both feet x1 month.    an established patient of [facility].    Moved to area.      Onset of symptoms was 1 month(s) ago.  Occurred on right 2nd toe initially and now involves most toes bilateral over past few weeks  Course of illness is worsening.    Context: tips of toes, near nails, red & swollen, painful if applied pressure  If cold, tingling & itchy,  Some have bumps  Feet cold faster.      Wears leather boots & wool socks.    Fhx - mom with Raynaud's.  No autoimmune disease.    Pt feels she has had bad circulation for years, has noticed whitish & bluish sometimes with cold weather.    Treatment measures tried include: nothing    Significant past medical history:  "no      Review of Systems   Constitutional: Negative for activity change and appetite change.   Musculoskeletal: Negative for arthralgias and myalgias.   Neurological: Negative for headaches.           Objective    /86   Pulse 116   Temp 98  F (36.7  C) (Temporal)   Resp 16   Ht 1.6 m (5' 3\")   Wt 59 kg (130 lb)   LMP 01/12/2022   SpO2 99%   BMI 23.03 kg/m    Physical Exam  Vitals reviewed.   Constitutional:       Appearance: Normal appearance.   Skin:     Comments: bilateral feet  Good pedal pulses bilateral   Tips of toes red, slightly tender   Neurological:      Mental Status: She is alert.                              "

## 2022-01-26 NOTE — PATIENT INSTRUCTIONS
Avoid cold weather activities    Wear insulated clothing,  Wear 2 pair socks.  shearlings / insulate shoes.    Recheck 1 month    Watch symptoms of color changes as potentially may also have Raynaud's with your family.    Treatment for both if not improving with conservative measures is   Calcium channel blocker.    establish care recheck 1 month.

## 2022-04-24 ENCOUNTER — HEALTH MAINTENANCE LETTER (OUTPATIENT)
Age: 22
End: 2022-04-24

## 2022-08-03 ENCOUNTER — MYC MEDICAL ADVICE (OUTPATIENT)
Dept: FAMILY MEDICINE | Facility: CLINIC | Age: 22
End: 2022-08-03

## 2022-08-04 NOTE — TELEPHONE ENCOUNTER
Please see my chart message.   Travel visa/medical certificate needed. (last visit with you 2017)  Appointment needed, is virtual okay?    Kristina Colón RN on 8/4/2022 at 8:27 AM

## 2022-08-15 ENCOUNTER — OFFICE VISIT (OUTPATIENT)
Dept: FAMILY MEDICINE | Facility: CLINIC | Age: 22
End: 2022-08-15
Payer: COMMERCIAL

## 2022-08-15 VITALS
RESPIRATION RATE: 16 BRPM | DIASTOLIC BLOOD PRESSURE: 68 MMHG | SYSTOLIC BLOOD PRESSURE: 128 MMHG | HEART RATE: 78 BPM | HEIGHT: 65 IN | OXYGEN SATURATION: 100 % | TEMPERATURE: 98.3 F | BODY MASS INDEX: 21.49 KG/M2 | WEIGHT: 129 LBS

## 2022-08-15 DIAGNOSIS — Z71.84 TRAVEL ADVICE ENCOUNTER: Primary | ICD-10-CM

## 2022-08-15 PROCEDURE — 99213 OFFICE O/P EST LOW 20 MIN: CPT | Performed by: NURSE PRACTITIONER

## 2022-08-15 ASSESSMENT — PAIN SCALES - GENERAL: PAINLEVEL: NO PAIN (0)

## 2022-08-15 NOTE — PROGRESS NOTES
Assessment & Plan     Travel advice encounter    Form signed.               See Patient Instructions  Patient Instructions   Please call and schedule your yearly physical and fasting lab appointment.  Contact [facility] for any recommendations on vaccines for [facility].  Enjoy.      Our Clinic hours are:  Mondays    7:20 am - 7 pm  Tues - Fri  7:20 am - 5 pm    Clinic Phone: [phone]    The clinic lab opens at 7:30 am Mon - Fri and appointments are required.    [facility]. [phone]  Monday  8 am - 7pm  Tues - Fri 8 am - 5:30 pm           Return in about 6 months (around 2/15/2023) for Med Check, Physical Exam, Lab Work.    NANNETTE Askew [facility]    Graciela Palacios is a 21 year old, presenting for the following health issues:  Forms (Note for travel )      She will be going to [employer] to [facility]. She needs a VISA for that. Has a form to be signed.  She is UTD on all routine vaccinations. She was advised to contact [facility] via [email or web address] for any suggestions for immunizations.  She is otherwise healthy and free of any known communicable diseases.  She is due for routine physical and states will do that soon.      History of Present Illness       Reason for visit:  I need a medical certificate of good health signed for a visa application [place] live [place]    She eats 2-3 servings of fruits and vegetables daily.She consumes 0 sweetened beverage(s) daily.She exercises with enough effort to increase her heart rate 20 to 29 minutes per day.  She exercises with enough effort to increase her heart rate 5 days per week.   She is taking medications regularly.       No current outpatient medications on file.     No current facility-administered medications for this visit.     Past Medical History:   Diagnosis Date     Acne            Review of Systems   Constitutional, HEENT, cardiovascular, pulmonary, gi and gu systems are negative, except as otherwise  "noted.      Objective    /68   Pulse 78   Temp 98.3  F (36.8  C)   Resp 16   Ht 1.645 m (5' 4.75\")   Wt 58.5 kg (129 lb)   LMP 08/09/2022   SpO2 100%   BMI 21.63 kg/m    Body mass index is 21.63 kg/m .  Physical Exam   GENERAL: healthy, alert and no distress  HENT: ear canals and TM's normal, pharynx without erythema  NECK: no adenopathy, no asymmetry  RESP: lungs clear to auscultation - no rales, rhonchi or wheezes  CV: regular rate and rhythm, normal S1 S2, no S3 or S4, no murmur  ABDOMEN: soft, nontender, no hepatosplenomegaly, no masses and bowel sounds normal  MS: no gross musculoskeletal defects noted                      .  ..  "

## 2022-08-15 NOTE — PATIENT INSTRUCTIONS
Please call and schedule your yearly physical and fasting lab appointment.  Contact [facility] for any recommendations on vaccines for [place].  Enjoy.      Our Clinic hours are:  Mondays    7:20 am - 7 pm  Tues -  Fri  7:20 am - 5 pm    Clinic Phone: [phone]    The clinic lab opens at 7:30 am Mon - Fri and appointments are required.    [facility]  Ph. [phone]  Monday  8 am - 7pm  Tues - Fri 8 am - 5:30 pm

## 2022-08-23 ENCOUNTER — MYC MEDICAL ADVICE (OUTPATIENT)
Dept: FAMILY MEDICINE | Facility: CLINIC | Age: 22
End: 2022-08-23

## 2022-08-24 NOTE — TELEPHONE ENCOUNTER
Forms    Tiffany Farfan APRN CNP  Cl Provider Careteam Pool 19 minutes ago (8:31 AM)     CV    Yes if you could drop off the forms and I will have Dr. Wilson sign today.   CAMILO Beasley    Message text       Above copied from routing message    Kristina Colón RN on 8/24/2022 at 8:52 AM

## 2022-11-19 ENCOUNTER — HEALTH MAINTENANCE LETTER (OUTPATIENT)
Age: 22
End: 2022-11-19

## 2023-06-01 ENCOUNTER — HEALTH MAINTENANCE LETTER (OUTPATIENT)
Age: 23
End: 2023-06-01

## 2023-07-19 ENCOUNTER — TELEPHONE (OUTPATIENT)
Dept: FAMILY MEDICINE | Facility: CLINIC | Age: 23
End: 2023-07-19
Payer: COMMERCIAL

## 2023-07-19 NOTE — TELEPHONE ENCOUNTER
Patient Quality Outreach    Patient is due for the following:   Physical Preventive Adult Physical    Next Steps:   Schedule a Adult Preventative    Type of outreach:    Sent letter.      Questions for provider review:    None           Jane Cardona CMA

## 2023-07-19 NOTE — LETTER
July 19, 2023    To  Suzanne Wray  [address] [place] [facility] [address]    Your team at [facility] cares about your health. We have reviewed your chart and based on our findings; we are making the following recommendations to better manage your health.     You are in particular need of attention regarding the following:     PREVENTATIVE VISIT: Physical    If you have already completed these items, please contact the clinic via phone or   MyChart so your care team can review and update your records. Thank you for   choosing [facility] for your healthcare needs. For any questions,   concerns, or to schedule an appointment please contact our clinic.    Healthy Regards,      Your [facility] Care Team

## 2024-06-16 ENCOUNTER — HEALTH MAINTENANCE LETTER (OUTPATIENT)
Age: 24
End: 2024-06-16

## 2025-06-21 ENCOUNTER — HEALTH MAINTENANCE LETTER (OUTPATIENT)
Age: 25
End: 2025-06-21